# Patient Record
Sex: FEMALE | Race: WHITE | NOT HISPANIC OR LATINO | Employment: FULL TIME | URBAN - METROPOLITAN AREA
[De-identification: names, ages, dates, MRNs, and addresses within clinical notes are randomized per-mention and may not be internally consistent; named-entity substitution may affect disease eponyms.]

---

## 2019-10-23 ENCOUNTER — TELEPHONE (OUTPATIENT)
Dept: GASTROENTEROLOGY | Facility: CLINIC | Age: 44
End: 2019-10-23

## 2019-10-23 NOTE — TELEPHONE ENCOUNTER
Received referral from pcp through faxes; requesting pt be scheduled for an office visit due to ongoing gastritis   Referral scanned into system

## 2019-10-24 ENCOUNTER — CONSULT (OUTPATIENT)
Dept: GASTROENTEROLOGY | Facility: CLINIC | Age: 44
End: 2019-10-24
Payer: COMMERCIAL

## 2019-10-24 VITALS
BODY MASS INDEX: 34.91 KG/M2 | HEART RATE: 90 BPM | WEIGHT: 197 LBS | DIASTOLIC BLOOD PRESSURE: 82 MMHG | HEIGHT: 63 IN | SYSTOLIC BLOOD PRESSURE: 116 MMHG

## 2019-10-24 DIAGNOSIS — K21.00 GASTROESOPHAGEAL REFLUX DISEASE WITH ESOPHAGITIS: Primary | ICD-10-CM

## 2019-10-24 DIAGNOSIS — K29.70 GASTRITIS DETERMINED BY ENDOSCOPY: ICD-10-CM

## 2019-10-24 PROCEDURE — 99214 OFFICE O/P EST MOD 30 MIN: CPT | Performed by: INTERNAL MEDICINE

## 2019-10-24 RX ORDER — SUCRALFATE 1 G/1
TABLET ORAL
COMMUNITY
Start: 2019-10-18

## 2019-10-24 RX ORDER — FAMOTIDINE 40 MG/1
40 TABLET, FILM COATED ORAL
Qty: 30 TABLET | Refills: 3 | Status: SHIPPED | OUTPATIENT
Start: 2019-10-24

## 2019-10-24 RX ORDER — PANTOPRAZOLE SODIUM 40 MG/1
40 TABLET, DELAYED RELEASE ORAL DAILY
Refills: 8 | COMMUNITY
Start: 2019-10-19

## 2019-10-24 RX ORDER — CYCLOBENZAPRINE HCL 5 MG
TABLET ORAL
Refills: 0 | COMMUNITY
Start: 2019-09-03

## 2019-10-24 RX ORDER — FLUTICASONE PROPIONATE 50 MCG
SPRAY, SUSPENSION (ML) NASAL
COMMUNITY
Start: 2019-09-30

## 2019-10-24 NOTE — PROGRESS NOTES
[address] [facility] - Outpatient Follow-up Note  Simran Perez 53 y o  female MRN: [id number]  Encounter: [id number]    ASSESSMENT AND PLAN:      1  Gastroesophageal reflux disease with esophagitis  Symptoms had been well controlled on pantoprazole and Zantac, developed breakthrough symptoms when she transitioned to Pepcid when Zantac was recalled  Long discussion about anti-reflux diet  She starts with a nutritionist Monday with the goal of weight loss  Will increase Pepcid to prescription strength in the interim  She will contact me if symptoms fail to improve  She will follow up in the office in 2 months with a goal of tapering acid suppressing medications    - famotidine (PEPCID) 40 MG tablet; Take 1 tablet (40 mg total) by mouth daily at bedtime  Dispense: 30 tablet; Refill: 3    2  Gastritis determined by endoscopy  Good relief on current medication regimen  Rarely needs Carafate as needed  Continue to avoid NSAIDs    3  RLQ discomfort  Intermittent, nontender on exam, not related to the bowel cycle  Hold off on imaging  She will contact me if symptoms persist    Followup Appointment:  2-3 months, call sooner if symptoms fail to improve  ______________________________________________________________________    Chief Complaint   Patient presents with    Heartburn     sx worsening    PCP referred    RLQ discomfort     intermittent     HPI:  The patient presents for follow-up on reflux  She was last seen in the office on October 2018  At that time symptoms were well controlled on Protonix, H2 blocker at bedtime and Carafate twice daily  She was able to wean off of the Carafate and now takes it only intermittently  A few months ago she stopped Zantac chun recalled  Symptoms flared so she started Pepcid 20 mg over-the-counter  She has been experiencing breakthrough symptoms but denies any alarm symptoms like dysphagia, will weight loss or melena    She admits that she has not been adhering to an anti-reflux diet  She has gained about 50 lb over the past 2 years  Reflux symptoms are worse with spicy foods  She avoids soda  She contacted a nutritionist to work on weight loss, her 1st visit is Monday October 28th  Historical Information   Past Medical History:   Diagnosis Date    GERD (gastroesophageal reflux disease)     Irritable bowel syndrome      Past Surgical History:   Procedure Laterality Date    ANTERIOR CRUCIATE LIGAMENT REPAIR      UPPER GASTROINTESTINAL ENDOSCOPY  08/14/2018    Gastritis     Social History     Substance and Sexual Activity   Alcohol Use Not on file     Social History     Substance and Sexual Activity   Drug Use Not on file     Social History     Tobacco Use   Smoking Status Never Smoker   Smokeless Tobacco Never Used     Family History   Problem Relation Age of Onset    Prostate cancer Father     Gout Father     Colon cancer Neg Hx     Colon polyps Neg Hx          Current Outpatient Medications:     cyclobenzaprine (FLEXERIL) 5 mg tablet    fluticasone (FLONASE) 50 mcg/act nasal spray    pantoprazole (PROTONIX) 40 mg tablet    sucralfate (CARAFATE) 1 g tablet    famotidine (PEPCID) 40 MG tablet  No Known Allergies  Reviewed medications and allergies and updated as indicated    PHYSICAL EXAM:    Blood pressure 116/82, pulse 90, height 5' 3" (1 6 m), weight 89 4 kg (197 lb)  Body mass index is 34 9 kg/m²  General Appearance: NAD, cooperative, alert  Eyes: Anicteric, PERRLA, EOMI  ENT:  Normocephalic, atraumatic, normal mucosa  Neck:  Supple, symmetrical, trachea midline  Resp:  Clear to auscultation bilaterally; no rales, rhonchi or wheezing; respirations unlabored   CV:  S1 S2, Regular rate and rhythm; no murmur, rub, or gallop  GI:  Soft, mild epigastric tenderness, non-distended; normal bowel sounds; no masses, no organomegaly   Rectal: Deferred  Musculoskeletal: No cyanosis, clubbing or edema  Normal ROM    Skin:  No jaundice, rashes, or lesions   Heme/Lymph: No palpable cervical lymphadenopathy  Psych: Normal affect, good eye contact  Neuro: No gross deficits, AAOx3    Lab Results:   No results found for: WBC, HGB, HCT, MCV, PLT  No results found for: NA, K, CL, CO2, ANIONGAP, BUN, CREATININE, GLUCOSE, GLUF, CALCIUM, CORRECTEDCA, AST, ALT, ALKPHOS, PROT, BILITOT, EGFR  No results found for: IRON, TIBC, FERRITIN  No results found for: LIPASE    Radiology Results:   No results found

## 2019-10-24 NOTE — LETTER
October 24, 1607     Kenzie Canas, [address] [facility] [address]    Patient: Ruthie Campa   YOB: 1975   Date of Visit: 10/24/2019       Dear Dr Swathi Montesinos: Thank you for referring Shelli Matamoros to me for evaluation  Below are my notes for this consultation  If you have questions, please do not hesitate to call me  I look forward to following your patient along with you  Sincerely,        Klaudia Garcia DO        CC: No Recipients  Klaudia Garcia DO  10/24/2019  5:14 PM  Incomplete  [address] [facility] - Outpatient Follow-up Note  Ruthie Campa 53 y o  female MRN: [id number]  Encounter: [id number]    ASSESSMENT AND PLAN:      1  Gastroesophageal reflux disease with esophagitis  Symptoms had been well controlled on pantoprazole and Zantac, developed breakthrough symptoms when she transitioned to Pepcid when Zantac was recalled  Long discussion about anti-reflux diet  She starts with a nutritionist Monday with the goal of weight loss  Will increase Pepcid to prescription strength in the interim  She will contact me if symptoms fail to improve  She will follow up in the office in 2 months with a goal of tapering acid suppressing medications    - famotidine (PEPCID) 40 MG tablet; Take 1 tablet (40 mg total) by mouth daily at bedtime  Dispense: 30 tablet; Refill: 3    2  Gastritis determined by endoscopy  Good relief on current medication regimen  Rarely needs Carafate as needed  Continue to avoid NSAIDs    3  RLQ discomfort  Intermittent, nontender on exam, not related to the bowel cycle  Hold off on imaging    She will contact me if symptoms persist    Followup Appointment:  2-3 months, call sooner if symptoms fail to improve  ______________________________________________________________________    Chief Complaint   Patient presents with    Heartburn     sx worsening    PCP referred    RLQ discomfort     intermittent     HPI:  The patient presents for follow-up on reflux  She was last seen in the office on October 2018  At that time symptoms were well controlled on Protonix, H2 blocker at bedtime and Carafate twice daily  She was able to wean off of the Carafate and now takes it only intermittently  A few months ago she stopped Zantac chun recalled  Symptoms flared so she started Pepcid 20 mg over-the-counter  She has been experiencing breakthrough symptoms but denies any alarm symptoms like dysphagia, will weight loss or melena  She admits that she has not been adhering to an anti-reflux diet  She has gained about 50 lb over the past 2 years  Reflux symptoms are worse with spicy foods  She avoids soda  She contacted a nutritionist to work on weight loss, her 1st visit is Monday October 28th  Historical Information   Past Medical History:   Diagnosis Date    GERD (gastroesophageal reflux disease)     Irritable bowel syndrome      Past Surgical History:   Procedure Laterality Date    ANTERIOR CRUCIATE LIGAMENT REPAIR      UPPER GASTROINTESTINAL ENDOSCOPY  08/14/2018    Gastritis     Social History     Substance and Sexual Activity   Alcohol Use Not on file     Social History     Substance and Sexual Activity   Drug Use Not on file     Social History     Tobacco Use   Smoking Status Never Smoker   Smokeless Tobacco Never Used     Family History   Problem Relation Age of Onset    Prostate cancer Father     Gout Father     Colon cancer Neg Hx     Colon polyps Neg Hx          Current Outpatient Medications:     cyclobenzaprine (FLEXERIL) 5 mg tablet    fluticasone (FLONASE) 50 mcg/act nasal spray    pantoprazole (PROTONIX) 40 mg tablet    sucralfate (CARAFATE) 1 g tablet    famotidine (PEPCID) 40 MG tablet  No Known Allergies  Reviewed medications and allergies and updated as indicated    PHYSICAL EXAM:    Blood pressure 116/82, pulse 90, height 5' 3" (1 6 m), weight 89 4 kg (197 lb)   Body mass index is 34 9 kg/m²  General Appearance: NAD, cooperative, alert  Eyes: Anicteric, PERRLA, EOMI  ENT:  Normocephalic, atraumatic, normal mucosa  Neck:  Supple, symmetrical, trachea midline  Resp:  Clear to auscultation bilaterally; no rales, rhonchi or wheezing; respirations unlabored   CV:  S1 S2, Regular rate and rhythm; no murmur, rub, or gallop  GI:  Soft,  mild epigastric tenderness, non-distended; normal bowel sounds; no masses, no organomegaly   Rectal: Deferred  Musculoskeletal: No cyanosis, clubbing or edema  Normal ROM  Skin:  No jaundice, rashes, or lesions   Heme/Lymph: No palpable cervical lymphadenopathy  Psych: Normal affect, good eye contact  Neuro: No gross deficits, AAOx3    Lab Results:   No results found for: WBC, HGB, HCT, MCV, PLT  No results found for: NA, K, CL, CO2, ANIONGAP, BUN, CREATININE, GLUCOSE, GLUF, CALCIUM, CORRECTEDCA, AST, ALT, ALKPHOS, PROT, BILITOT, EGFR  No results found for: IRON, TIBC, FERRITIN  No results found for: LIPASE    Radiology Results:   No results found

## 2022-10-05 ENCOUNTER — ANNUAL EXAM (OUTPATIENT)
Dept: OBGYN CLINIC | Facility: CLINIC | Age: 47
End: 2022-10-05

## 2022-10-05 VITALS
WEIGHT: 207.8 LBS | BODY MASS INDEX: 36.82 KG/M2 | HEIGHT: 63 IN | DIASTOLIC BLOOD PRESSURE: 80 MMHG | SYSTOLIC BLOOD PRESSURE: 140 MMHG

## 2022-10-05 DIAGNOSIS — Z01.419 ENCOUNTER FOR GYNECOLOGICAL EXAMINATION WITHOUT ABNORMAL FINDING: Primary | ICD-10-CM

## 2022-10-05 DIAGNOSIS — Z80.3 FAMILY HISTORY OF BREAST CANCER IN FEMALE: ICD-10-CM

## 2022-10-05 DIAGNOSIS — Z12.4 ENCOUNTER FOR PAPANICOLAOU SMEAR FOR CERVICAL CANCER SCREENING: ICD-10-CM

## 2022-10-05 DIAGNOSIS — Z12.31 BREAST CANCER SCREENING BY MAMMOGRAM: ICD-10-CM

## 2022-10-05 RX ORDER — OMEPRAZOLE 20 MG/1
20 CAPSULE, DELAYED RELEASE ORAL DAILY
COMMUNITY

## 2022-10-05 NOTE — PATIENT INSTRUCTIONS
Monitor cycles  Call periods coming closer than 21 days start to start  Lasting longer than 10 days  Soaking a super plus pad tampon in an hour for several hours      Kegel exercises Bladder ok no intervention needed at this time   Yearly mammo  PAP done today

## 2022-10-05 NOTE — PROGRESS NOTES
Assessment/Plan:    Monitor cycles  Call periods coming closer than 21 days start to start  Lasting longer than 10 days  Soaking a super plus pad tampon in an hour for several hours  Kegel exercises Bladder ok no intervention needed at this time   Yearly mammo  PAP done today     Diagnoses and all orders for this visit:    Encounter for gynecological examination without abnormal finding  -     IGP, Aptima HPV, Rfx 16/18,45    Breast cancer screening by mammogram  -     Mammo screening bilateral w 3d & cad; Future    Family history of breast cancer in female PGM  -     Mammo screening bilateral w 3d & cad; Future    Encounter for Papanicolaou smear for cervical cancer screening  -     IGP, Aptima HPV, Rfx 16/18,45    Other orders  -     omeprazole (PriLOSEC) 20 mg delayed release capsule; Take 20 mg by mouth daily          Subjective:      Patient ID: lOy Almaguer is a 50 y o  female  Here for annual gyn  Periods missed 3 periods this year  They have been closer together and heavier  Denies abd or pelvic pain  Bowel and bladder are normal  She has had some leakage often will occur right after voiding will stand up and have some more  No urge incont No unusual discharge PAP 2018 neg/neg No h/o abn pap FH breast cancer PGM  Had mammo and repeat US all ok Counselor [facility]       The following portions of the patient's history were reviewed and updated as appropriate: allergies, current medications, past family history, past medical history, past social history, past surgical history and problem list     Review of Systems   Constitutional: Negative for fatigue and unexpected weight change  Gastrointestinal: Negative for abdominal distention, abdominal pain, constipation and diarrhea  Genitourinary: Negative for difficulty urinating, dyspareunia, dysuria, frequency, genital sores, menstrual problem, pelvic pain, urgency, vaginal bleeding, vaginal discharge and vaginal pain  Neurological: Negative for headaches  Psychiatric/Behavioral: Negative  Negative for dysphoric mood  The patient is not nervous/anxious  Objective:      /80 (BP Location: Left arm, Patient Position: Sitting, Cuff Size: Large)   Ht 5' 2 75" (1 594 m)   Wt 94 3 kg (207 lb 12 8 oz)   LMP 09/15/2022 (Exact Date)   Breastfeeding No   BMI 37 10 kg/m²          Physical Exam  Vitals and nursing note reviewed  Constitutional:       General: She is not in acute distress  Appearance: Normal appearance  HENT:      Head: Normocephalic and atraumatic  Pulmonary:      Effort: Pulmonary effort is normal    Chest:   Breasts: Breasts are symmetrical       Right: Normal  No mass, nipple discharge, skin change, tenderness or axillary adenopathy  Left: Normal  No mass, nipple discharge, skin change, tenderness or axillary adenopathy  Abdominal:      General: There is no distension  Palpations: Abdomen is soft  Tenderness: There is no abdominal tenderness  There is no guarding or rebound  Genitourinary:     General: Normal vulva  Exam position: Lithotomy position  Labia:         Right: No rash, tenderness, lesion or injury  Left: No rash, tenderness, lesion or injury  Urethra: No prolapse, urethral pain, urethral swelling or urethral lesion  Vagina: Normal  No erythema or lesions  Cervix: No cervical motion tenderness, discharge, lesion or cervical bleeding  Uterus: Normal        Adnexa: Right adnexa normal and left adnexa normal         Right: No mass or tenderness  Left: No mass or tenderness  Rectum: No mass or external hemorrhoid  Comments: PAP from cervix  Musculoskeletal:         General: Normal range of motion  Lymphadenopathy:      Upper Body:      Right upper body: No axillary adenopathy  Left upper body: No axillary adenopathy  Lower Body: No right inguinal adenopathy  No left inguinal adenopathy  Skin:     General: Skin is warm and dry  Neurological:      Mental Status: She is alert and oriented to person, place, and time  Psychiatric:         Mood and Affect: Mood normal          Behavior: Behavior normal          Thought Content:  Thought content normal          Judgment: Judgment normal

## 2022-10-08 LAB
CYTOLOGIST CVX/VAG CYTO: NORMAL
DX ICD CODE: NORMAL
HPV I/H RISK 4 DNA CVX QL PROBE+SIG AMP: NEGATIVE
OTHER STN SPEC: NORMAL
PATH REPORT.FINAL DX SPEC: NORMAL
SL AMB NOTE:: NORMAL
SL AMB SPECIMEN ADEQUACY: NORMAL
SL AMB TEST METHODOLOGY: NORMAL